# Patient Record
Sex: FEMALE | Race: WHITE
[De-identification: names, ages, dates, MRNs, and addresses within clinical notes are randomized per-mention and may not be internally consistent; named-entity substitution may affect disease eponyms.]

---

## 2019-10-15 ENCOUNTER — HOSPITAL ENCOUNTER (EMERGENCY)
Dept: HOSPITAL 26 - MED | Age: 49
Discharge: HOME | End: 2019-10-15
Payer: MEDICAID

## 2019-10-15 VITALS — SYSTOLIC BLOOD PRESSURE: 148 MMHG | DIASTOLIC BLOOD PRESSURE: 97 MMHG

## 2019-10-15 VITALS — WEIGHT: 144 LBS | HEIGHT: 61 IN | BODY MASS INDEX: 27.19 KG/M2

## 2019-10-15 VITALS — SYSTOLIC BLOOD PRESSURE: 152 MMHG | DIASTOLIC BLOOD PRESSURE: 108 MMHG
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
## 2019-10-15 DIAGNOSIS — Z79.899: ICD-10-CM

## 2019-10-15 DIAGNOSIS — S90.122A: Primary | ICD-10-CM
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
## 2019-10-15 DIAGNOSIS — Z98.890: ICD-10-CM
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
## 2019-10-15 PROCEDURE — 96372 THER/PROPH/DIAG INJ SC/IM: CPT

## 2019-10-15 PROCEDURE — 73660 X-RAY EXAM OF TOE(S): CPT

## 2019-10-15 PROCEDURE — 99283 EMERGENCY DEPT VISIT LOW MDM: CPT

## 2019-10-15 NOTE — NUR
49/F BIB SELF C/O L 5TH DIGIT PAIN/SWELLING. 5/10. PT STATES SHE STUBBED HER 
TOE TODAY ON HER LUGGAGE. PMH- DENIES. PATIENT STATES PAIN OF 5/10 AT THIS 
TIME; PATIENT POSITIONED FOR COMFORT; HOB ELEVATED; BEDRAILS UP X1; BED DOWN. 
ER MD MADE AWARE OF PT STATUS.

## 2019-10-15 NOTE — NUR
Patient discharged with v/s stable. Written and verbal after care instructions 
given and explained. 

Patient alert, oriented and verbalized understanding of instructions. 
Ambulatory with steady gait. All questions addressed prior to discharge. ID 
band removed. Patient advised to follow up with PMD. Rx of ACETAMINOPHEN given. 
Patient educated on indication of medication including possible reaction and 
side effects. Opportunity to ask questions provided and answered.

PT GIVEN EXCUSE FOR WORK